# Patient Record
Sex: FEMALE | Race: BLACK OR AFRICAN AMERICAN | NOT HISPANIC OR LATINO | Employment: FULL TIME | URBAN - METROPOLITAN AREA
[De-identification: names, ages, dates, MRNs, and addresses within clinical notes are randomized per-mention and may not be internally consistent; named-entity substitution may affect disease eponyms.]

---

## 2023-03-03 LAB
ALANINE AMINOTRANSFERASE (SGPT) (U/L) IN SER/PLAS: 24 U/L (ref 7–45)
ALBUMIN (G/DL) IN SER/PLAS: 4.3 G/DL (ref 3.4–5)
ALKALINE PHOSPHATASE (U/L) IN SER/PLAS: 106 U/L (ref 33–110)
ANION GAP IN SER/PLAS: 11 MMOL/L (ref 10–20)
ASPARTATE AMINOTRANSFERASE (SGOT) (U/L) IN SER/PLAS: 21 U/L (ref 9–39)
BILIRUBIN TOTAL (MG/DL) IN SER/PLAS: 0.5 MG/DL (ref 0–1.2)
CALCIUM (MG/DL) IN SER/PLAS: 10.5 MG/DL (ref 8.6–10.6)
CARBON DIOXIDE, TOTAL (MMOL/L) IN SER/PLAS: 29 MMOL/L (ref 21–32)
CHLORIDE (MMOL/L) IN SER/PLAS: 106 MMOL/L (ref 98–107)
CREATININE (MG/DL) IN SER/PLAS: 0.85 MG/DL (ref 0.5–1.05)
GFR FEMALE: 80 ML/MIN/1.73M2
GLUCOSE (MG/DL) IN SER/PLAS: 80 MG/DL (ref 74–99)
POTASSIUM (MMOL/L) IN SER/PLAS: 4.5 MMOL/L (ref 3.5–5.3)
PROTEIN TOTAL: 6.3 G/DL (ref 6.4–8.2)
SODIUM (MMOL/L) IN SER/PLAS: 141 MMOL/L (ref 136–145)
THYROTROPIN (MIU/L) IN SER/PLAS BY DETECTION LIMIT <= 0.05 MIU/L: 1.12 MIU/L (ref 0.44–3.98)
THYROXINE (T4) FREE (NG/DL) IN SER/PLAS: 1.43 NG/DL (ref 0.78–1.48)
UREA NITROGEN (MG/DL) IN SER/PLAS: 14 MG/DL (ref 6–23)

## 2023-03-04 LAB — PARATHYRIN INTACT (PG/ML) IN SER/PLAS: 43.9 PG/ML (ref 18.5–88)

## 2023-03-06 LAB
THYROGLOBULIN AB (IU/ML) IN SER/PLAS: <0.9 IU/ML (ref 0–4)
THYROGLOBULIN LC-MS/MS: NORMAL NG/ML (ref 1.3–31.8)
THYROGLOBULIN: 3.1 NG/ML (ref 1.3–31.8)

## 2024-02-15 ENCOUNTER — OFFICE VISIT (OUTPATIENT)
Dept: PAIN MEDICINE | Facility: CLINIC | Age: 59
End: 2024-02-15
Payer: COMMERCIAL

## 2024-02-15 DIAGNOSIS — M47.817 LUMBOSACRAL SPONDYLOSIS WITHOUT MYELOPATHY: Primary | ICD-10-CM

## 2024-02-15 DIAGNOSIS — M67.40 GANGLION CYST: ICD-10-CM

## 2024-02-15 DIAGNOSIS — M16.9 HIP ARTHROSIS: ICD-10-CM

## 2024-02-15 PROCEDURE — 99213 OFFICE O/P EST LOW 20 MIN: CPT | Performed by: ANESTHESIOLOGY

## 2024-02-15 RX ORDER — LIDOCAINE 50 MG/G
1 PATCH TOPICAL DAILY
Qty: 30 PATCH | Refills: 0 | Status: SHIPPED | OUTPATIENT
Start: 2024-02-15

## 2024-02-15 ASSESSMENT — PAIN - FUNCTIONAL ASSESSMENT: PAIN_FUNCTIONAL_ASSESSMENT: 0-10

## 2024-02-15 ASSESSMENT — PAIN SCALES - GENERAL: PAINLEVEL_OUTOF10: 6

## 2024-02-15 NOTE — PROGRESS NOTES
Subjective   Patient ID: Rachel Powell is a 58 y.o. female with a past medical history of Fibromyalgia, left hip pain.  She was last seen in this clinic 7/28/2022 with pain complaints consistent with fibromyalgia, lumbar radiculopathy, hip osteoarthritis.  She was referred to rheumatology for further evaluation of her hip arthritis.  At that time she was seen by rheumatology but did not want to see physical therapy.  Patient reports that that she is continue to have left hip pain and that is her primary complaint today.  States that over the past 1 month her pain has become significantly worse in the left hip which she attributes to a ganglion cyst seen on MRI 8/18/2021.  She is taking ibuprofen 800 mg 3 times daily, Tylenol with minimal relief.  She has not had recent physical therapy.  She states that her pain is excruciating and is mostly in the left hip and groin region.  Notes that her pain worsens with prolonged sitting and going up and down stairs.  She has improvement with icing her hip and with rest.  She occasionally has left anterior thigh pain but states that this originates in the groin and occurs with certain positions such as laying on her hips without a pillow between her legs.  Otherwise denies any numbness or tingling.  She previously had left hip injection 2021 but notes 0% improvement with this.        Physical Therapy: The patient has not done physical therapy within the past six months  Other Conservative Measures she has tried: Ice and Injections  Classes of medications tried in the past: Acetaminophen and NSAIDs    Review of Systems   13-point ROS done and negative except for HPI.     Current Outpatient Medications   Medication Instructions    lidocaine (Lidoderm) 5 % patch 1 patch, transdermal, Daily, Remove & discard patch within 12 hours or as directed by MD.       Past Medical History:   Diagnosis Date    Sickle-cell trait (CMS/[facility]) 09/24/2015    Sickle cell trait        No past surgical  history on file.     No family history on file.     No Known Allergies     Objective     There were no vitals filed for this visit.     Physical Exam  General: NAD, well groomed, well nourished  Eyes: Non-icteric sclera, EOMI  Ears, Nose, Mouth, and Throat: External ears and nose appear to be without deformity or rash. No lesions or masses noted. Hearing is grossly intact.   Neck: Trachea midline  Respiratory: Nonlabored breathing   Cardiovascular: no peripheral edema   Skin: No rashes or open lesions/ulcers identified on skin.    Back:   Palpation: Mild tenderness to palpation over left lumbar paraspinous muscles.   Straight leg raise: does not reproduce their pain, bilaterally   SI Joint: DOROTEO on the left with left groin pain    Left Hip: Pain over left greater trochanter. and Pain reproduced with internal/external rotation.    Neurologic:   Cranial nerves grossly intact.   Strength 5/5 and symmetric plantar/dorsiflexion. Strength 4/5 with left hip flexion   Sensation: Normal to light touch throughout,   DTRs:normal and symmetric throughout    Psychiatric: Alert, orientation to person, place, and time. Cooperative.    Imaging personally reviewed and independently interpreted: Hip x-ray 9/13/2021 shows cam morphology of the right hip, no significant degenerative changes in hip.  Hip MRI 8/18/2021 shows moderate SI joint degenerative changes, left intra acetabular ganglion cyst as well as mild bilateral hip arthrosis.    Assessment/Plan   58 y.o. female with a past medical history of Fibromyalgia, left hip pain.  She was last seen in this clinic 7/28/2022 with pain complaints consistent with fibromyalgia, lumbar radiculopathy, hip osteoarthritis.  She returns to clinic today with severe left hip pain which has worsened over the past 1 month.  She had an MRI 8/18/2021 which showed mild bilateral hip arthrosis as well as left anterior acetabular ganglion cyst.  Was seen by Dr. Licea around that time and given a hip  injection without any improvement.  Exam, history, imaging consistent with left hip pathology.    Plan:  -Patient referred back to orthopedic surgery, Dr. Licea (has seen in the past but not recently) for further evaluation of left hip and ganglion cyst  -Discussed with the patient that should no surgical intervention be warranted, if she continues to have ongoing pain, we could consider doing diagnostic sensory obturator and femoral nerve branch blocks to assist with pain relief.  If she does have relief with the diagnostic block could consider radiofrequency ablation.  However, would like patient to see orthopedic surgery before doing any hip procedures.  Might also consider a fluoroscopically guided intra-articular hip injection.  -Prescription for lidocaine patches per patient request  The patient was invited to contact us back anytime with any questions or concerns and follow-up with us in the office as needed.     Diagnoses and all orders for this visit:  Lumbosacral spondylosis without myelopathy  -     Referral to Orthopaedic Surgery; Future  -     lidocaine (Lidoderm) 5 % patch; Place 1 patch over 12 hours on the skin once daily. Remove & discard patch within 12 hours or as directed by MD.  Hip arthrosis  Ganglion cyst      This note was generated with the aid of dictation software, there may be typos despite my attempts at proofreading.

## 2024-03-27 ENCOUNTER — OFFICE VISIT (OUTPATIENT)
Dept: ORTHOPEDIC SURGERY | Facility: HOSPITAL | Age: 59
End: 2024-03-27
Payer: COMMERCIAL

## 2024-03-27 ENCOUNTER — HOSPITAL ENCOUNTER (OUTPATIENT)
Dept: RADIOLOGY | Facility: HOSPITAL | Age: 59
Discharge: HOME | End: 2024-03-27
Payer: COMMERCIAL

## 2024-03-27 DIAGNOSIS — M25.552 LEFT HIP PAIN: ICD-10-CM

## 2024-03-27 DIAGNOSIS — M47.817 LUMBOSACRAL SPONDYLOSIS WITHOUT MYELOPATHY: ICD-10-CM

## 2024-03-27 PROCEDURE — 73502 X-RAY EXAM HIP UNI 2-3 VIEWS: CPT | Mod: LEFT SIDE | Performed by: RADIOLOGY

## 2024-03-27 PROCEDURE — 73502 X-RAY EXAM HIP UNI 2-3 VIEWS: CPT | Mod: LT

## 2024-03-27 PROCEDURE — 99204 OFFICE O/P NEW MOD 45 MIN: CPT | Performed by: ORTHOPAEDIC SURGERY

## 2024-03-27 PROCEDURE — 99214 OFFICE O/P EST MOD 30 MIN: CPT | Performed by: ORTHOPAEDIC SURGERY

## 2024-03-27 RX ORDER — CEPHRADINE 500 MG
CAPSULE ORAL
COMMUNITY
Start: 2019-01-04

## 2024-03-27 RX ORDER — MULTIVITAMIN
TABLET ORAL
COMMUNITY
Start: 2022-01-28

## 2024-03-27 RX ORDER — IBUPROFEN 800 MG/1
TABLET ORAL EVERY 12 HOURS
COMMUNITY
Start: 2017-10-10

## 2024-03-27 RX ORDER — IBUPROFEN AND DIPHENHYDRAMINE CITRATE 200; 38 MG/1; MG/1
1 TABLET, COATED ORAL NIGHTLY
COMMUNITY
Start: 2022-10-20

## 2024-03-27 RX ORDER — LEVOTHYROXINE SODIUM 125 UG/1
125 TABLET ORAL DAILY
COMMUNITY

## 2024-03-27 RX ORDER — ACETAMINOPHEN 325 MG/1
TABLET ORAL 3 TIMES DAILY PRN
COMMUNITY

## 2024-03-27 ASSESSMENT — PAIN SCALES - GENERAL: PAINLEVEL_OUTOF10: 8

## 2024-03-27 ASSESSMENT — PAIN DESCRIPTION - DESCRIPTORS: DESCRIPTORS: OTHER (COMMENT);PRESSURE

## 2024-03-27 ASSESSMENT — PAIN - FUNCTIONAL ASSESSMENT: PAIN_FUNCTIONAL_ASSESSMENT: 0-10

## 2024-03-27 NOTE — PROGRESS NOTES
HPI  This is a pleasant 58 y.o. female here today for left hip pain. The pt has long history of degenerative lumbar pain for which she sees pain management. She has a history of L hip evaluated in 2021 and treated with a L hip CSI without relief. MRI at that time showed a paralabral cyst. Her pain since improved to some degree and she is now presenting about 3 months of worsening L groin pain. She works as a cleaning person at an apartment complex. She takes Tylenol and Ibuprofen and Lidocaine patches for her hip and back. She states that work related activity and prolonged sitting exacerbate her pain. Denies any injuries or trauma. She states that CSI to her lumbar spine no longer help.         Past Medical History:   Diagnosis Date    Sickle-cell trait (CMS/[facility]) 09/24/2015    Sickle cell trait       Past Surgical History:   Procedure Laterality Date    CARPAL TUNNEL RELEASE Right     HYSTERECTOMY      THYROIDECTOMY         Social History     Tobacco Use    Smoking status: Some Days     Types: Cigarettes    Smokeless tobacco: Never   Vaping Use    Vaping Use: Never used   Substance Use Topics    Alcohol use: Yes     Comment: a couple of drinks per year on special occasions    Drug use: Yes     Types: Marijuana          ROS  Review of systems reviewed and pertinent positives mentioned in HPI.      PHYSICAL EXAM  There is not  pain with a resisted situp.    There is not abdominal distention or tenderness    The patient's range of motion reveals that they have 90° of hip flexion on the affected side.   Hip extension to 10°   Abduction to 45°      The patient is 5 out of 5 strength with resisted hip AB, adduction, hamstring and quadriceps testing.    Pain over the hip flexor, ASIS.  Pain over the proximal hamstring, piriformis      Pain Provacation testing:    Positive impingement sign,with a painful arc from 12 to 3:00.  Negative Psoas impingement/Ehsan test  Negative instability, Log roll.  Positive subspine  impingement test, which is pain with straight hip flexion.    Negative straight leg raise.  Negative circumduction clunk.      Peritrochanteric space examination:  Tenderness over the aki-trochanteric space - Yes  pain over the posterior trochanter - Yes      IMAGING  X-rays reviewed reveal no gross fracture or dislocation. and moderate degenerative changes noted.    MRI reviewed reveals No MRI available for review      ASSESSMENT  Left  osteoarthritis      PLAN  This is a 58 y.o. female patient here today with significant hip pain.    I discussed that the etiology of the patients pain is unclear at this time as she has a long history of degenerative lumbar pain and evidence of moderate to severe OA of her L hip on Xray today as well as symptoms including groin pain. A L hip CSI in 2021 was unsuccessful in providing any relief. However, the presentation today is new and we discussed that there is utility and repeating an injection as this may address potential hip OA pain and provide diagnostic information to help guide treatment. She will be referred to Steffanie ARCOS for a US guided L hip injection and was instructed to keep mental note of what effect the injection provides and at what time and to what degree. All questions were addressed and pt is agreeable with plan.

## 2024-04-17 ENCOUNTER — HOSPITAL ENCOUNTER (OUTPATIENT)
Dept: RADIOLOGY | Facility: EXTERNAL LOCATION | Age: 59
Discharge: HOME | End: 2024-04-17
Payer: COMMERCIAL

## 2024-04-17 ENCOUNTER — OFFICE VISIT (OUTPATIENT)
Dept: ORTHOPEDIC SURGERY | Facility: HOSPITAL | Age: 59
End: 2024-04-17
Payer: COMMERCIAL

## 2024-04-17 DIAGNOSIS — M16.12 PRIMARY OSTEOARTHRITIS OF LEFT HIP: Primary | ICD-10-CM

## 2024-04-17 DIAGNOSIS — M25.552 LEFT HIP PAIN: ICD-10-CM

## 2024-04-17 PROCEDURE — 2500000005 HC RX 250 GENERAL PHARMACY W/O HCPCS: Performed by: PHYSICIAN ASSISTANT

## 2024-04-17 PROCEDURE — 2500000004 HC RX 250 GENERAL PHARMACY W/ HCPCS (ALT 636 FOR OP/ED): Performed by: PHYSICIAN ASSISTANT

## 2024-04-17 PROCEDURE — 20611 DRAIN/INJ JOINT/BURSA W/US: CPT | Performed by: PHYSICIAN ASSISTANT

## 2024-04-17 NOTE — PROGRESS NOTES
Left hip      Patient is here today regarding left hip pain.  She recently saw Dr. Licea.  She has osteoarthritis of the left hip.  She is also had some symptoms consistent with a lumbar radiculopathy.  She has had injections for the back but does no longer help.  Dr. Licea suggested an intra-articular injection for both diagnostic and therapeutic purposes.  She would like to proceed with that option.        Patient ID: Rachel Powell is a 58 y.o. female.    L Inj/Asp: L hip joint on 4/18/2024 11:18 AM  Indications: pain  Details: 20 G needle, ultrasound-guided anterior approach  Medications: 40 mg methylPREDNISolone acetate 40 mg/mL; 4 mL lidocaine 10 mg/mL (1 %)    After consent was obtained, the anterior left hip was prepped in a sterile fashion. Ultrasound guidance was used to help insure proper needle placement into the hip joint, decrease patient discomfort, and decrease collateral damage. The joint was aspirated and Depo 40 mg with lidocaine 4cc were injected without any complications. Ultrasound images were saved on an internal file for later referene. The patient tolerated the procedure well and the area was cleaned and bandaged.    Procedure, treatment alternatives, risks and benefits explained, specific risks discussed. Consent was given by the patient. Immediately prior to procedure a time out was called to verify the correct patient, procedure, equipment, support staff and site/side marked as required. Patient was prepped and draped in the usual sterile fashion.             Steffanie Saucedo PA-C

## 2024-04-18 PROCEDURE — 20611 DRAIN/INJ JOINT/BURSA W/US: CPT | Performed by: PHYSICIAN ASSISTANT

## 2024-04-18 PROCEDURE — 2500000005 HC RX 250 GENERAL PHARMACY W/O HCPCS: Performed by: PHYSICIAN ASSISTANT

## 2024-04-18 PROCEDURE — 2500000004 HC RX 250 GENERAL PHARMACY W/ HCPCS (ALT 636 FOR OP/ED): Performed by: PHYSICIAN ASSISTANT

## 2024-04-18 RX ORDER — METHYLPREDNISOLONE ACETATE 40 MG/ML
40 INJECTION, SUSPENSION INTRA-ARTICULAR; INTRALESIONAL; INTRAMUSCULAR; SOFT TISSUE
Status: COMPLETED | OUTPATIENT
Start: 2024-04-18 | End: 2024-04-18

## 2024-04-18 RX ORDER — LIDOCAINE HYDROCHLORIDE 10 MG/ML
4 INJECTION INFILTRATION; PERINEURAL
Status: COMPLETED | OUTPATIENT
Start: 2024-04-18 | End: 2024-04-18

## 2024-04-18 RX ADMIN — LIDOCAINE HYDROCHLORIDE 4 ML: 10 INJECTION, SOLUTION INFILTRATION; PERINEURAL at 11:18

## 2024-04-18 RX ADMIN — METHYLPREDNISOLONE ACETATE 40 MG: 40 INJECTION, SUSPENSION INTRA-ARTICULAR; INTRALESIONAL; INTRAMUSCULAR; INTRASYNOVIAL; SOFT TISSUE at 11:18

## 2024-05-10 ENCOUNTER — EVALUATION (OUTPATIENT)
Dept: PHYSICAL THERAPY | Facility: CLINIC | Age: 59
End: 2024-05-10
Payer: COMMERCIAL

## 2024-05-10 DIAGNOSIS — M16.12 PRIMARY OSTEOARTHRITIS OF LEFT HIP: ICD-10-CM

## 2024-05-10 PROCEDURE — 97110 THERAPEUTIC EXERCISES: CPT | Mod: GP | Performed by: PHYSICAL THERAPIST

## 2024-05-10 PROCEDURE — 97161 PT EVAL LOW COMPLEX 20 MIN: CPT | Mod: GP | Performed by: PHYSICAL THERAPIST

## 2024-05-10 ASSESSMENT — ENCOUNTER SYMPTOMS
DEPRESSION: 0
OCCASIONAL FEELINGS OF UNSTEADINESS: 0
LOSS OF SENSATION IN FEET: 0
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
## 2024-05-14 ENCOUNTER — TREATMENT (OUTPATIENT)
Dept: PHYSICAL THERAPY | Facility: CLINIC | Age: 59
End: 2024-05-14
Payer: COMMERCIAL

## 2024-05-14 DIAGNOSIS — M16.12 PRIMARY OSTEOARTHRITIS OF LEFT HIP: ICD-10-CM

## 2024-05-14 PROCEDURE — 97113 AQUATIC THERAPY/EXERCISES: CPT | Mod: GP | Performed by: PHYSICAL THERAPIST

## 2024-05-14 NOTE — PROGRESS NOTES
PHYSICAL THERAPY NOTE    Patient Name: Rachel Powell  MRN: [id number]  Today's Date: 5/14/2024    Time Calculation  Start Time: 1220  Stop Time: 1305  Time Calculation (min): 45 min     PT Therapeutic Procedures Time Entry  Aquatic Therapy Time Entry: 45                 Diagnosis:  OA L Hip  Referred by:  Steffanie Saucedo PA-C    INSURANCE 80% w/ deductible   Visit 1   Visit Limits: 60   Cert Dates: No V used   Rechecks:    Eval PT: LK     Precautions and PMHx Fall Risk Supervision   Sickle cell trait; 2002 total hysterectomy NONE NONE   2018 thyroidectomy, 2019 CTS     Rtc tendonitis 2016;  fibromyalgia       SUBJECTIVE  HPI/DOI/MOUNIKA:    Being followed by Dr. Licea for left hip OA and may have to have B hip replacements in the future.   HX chronic back/hip B pain.  PAIN / LOCATION / DESCRIPTION:     3/10 right hip and back B sides  PAIN AGGRAVATING FACTORS:  sitting and rising from sitting is when her hip hurts (after 30 min of sitting)  FUNCTIONAL LIMITATIONS:  prolonged sitting  Xrays:   OA severe; cam morphology of femur associated with femoroacetabular impingement; acetabular ganglion cyst, subchondral marrow edema left > right  MRI/CT scan:  glute medius tendinosis w/ bursal fluid.  Osteophytes in pubic symphysis, pelvis, facet joints  MEDICATIONS:  has had hip and back injections in PMHX.    PLOF:   long hx of B hip and back/pelvis pain  PATIENT STATED GOALS:   reduce pain  HEP:  DID NOT PERFORM AT ALL - reports she will try after today's visit.    OBJ Hip  Left leg slightly longer than Right  Balance:    10 sec B    STRENGTH:   ?/5  Hip Flex R/L:    5/4    Hip Abd R/L:  4/4-  Hip Ext R/L:  4/4  Hip ER R/L:   4/4  Hip IR R/L:    4/4  Knee Ext R/L:  4/4  Knee Flex R/L:   4/4  Ankle DF R/L:    5/4    ROM: (supine) DEG.  Hip  ER R/L:   30/30  IR R/L:   15/15  KNEE R:  0-118  KNEE L:  0-115    Lumbar ROM:  Flexion 55 deg  Extension 20 deg    Special Testing for Intra-articular HIP pathology:  Ehsan Test (assess  hip/SI and labral pathology: negative R;   groin L pain    Lumbar Repeated Testing Screen:  low back 4/10, no pain in B hips/groin  RFIS  produced left hip pain 4/10; NW,   low back abolished pain  GENO  produced right leg electrical feeling from foot to lateral hip region; NW,   ne back pain, ne left hip  Prone lying: ne  Caden:  electricity down both legs; NW  PPU: n/t due to rad down both legs to feet (plantar surfaces):  L5-S1    Therapeutic Interventions:  Aquatics  Walking forwards x 4 laps  Mini squats x 30  Heel raises x 30  Hip abduction x 30  Hip extension x 30  Hip flexion x 30  Ham curls x 30  Hip circumduction x 30  Btw:  dls B UE horizontal abd/add  Btw:  dls B UE flex/ext  Side stepping w/ B UE abd/add  Steps:  hamstring stretch:  30 sec x 2  Steps:  knee flexion stretch and bend over:  stretch calf on other side as well 30 sec x 2              ASSESSMENT  Response to interventions:   Patient tolerated treatment well without increased pain or complaints following treatment.   Initiated aquatic therapy, per patient's agreement.  She was able to perform motions requested but modified as needed due to B hip rom loss w/ er/ir.  She tolerated flexion bias for her back well.  Prolonged standing in chest high water was tolerated well.   Patient did demonstrate some minor fear so will not attempt deep water activities at this time.  Continue water for  B hip/back rom, flex, strengthening as mary..     Progress toward functional goals:   Initiated goals and poc this date.    GOALS: includes patient and therapist collaboration and stated goals:  Patient to be seen 2 x a week for 6 weeks:  ST weeks:   Patient independent with home exercise program.  LT-8 weeks (unless otherwise noted below):  1.  Patient to report reduction of pain by 50-60% at minimum in order to improve ckc tolerances.  2.  Patient to demonstrate an improvement in arom to reach gait and step goals.  3.  Patient to demonstrate increase  in strength by 1 mmt grade in order to reach gait and step goals.  4.  Patient able to walk on level surfaces w/ LRD or no AD (if appropriate) x 1000 feet, I or MI, with appropriate gait pattern.  5.  Patient able to ascend/descend 12 steps x 1 rep with one HR, I or MI (LRD), reciprocal pattern with appropriate technique for safety.  6.  Patient to demonstrate improvement in balance to good on level surfaces during gait activities performed in clinic with I or MI, no AD or LRD.  7.  Patient to demonstrate improvement in functional outcome form to reach gait and step goals.     Justification for skilled care:   Patient will require skilled PT care 1-2 x a week for 6 weeks in order to assess and modify hep / clinical program in order to reach goals and achieve functional mobility / PLOF .       Education and Treatment Interventions performed this date:   TX rationale, HEP, safety education, fall prevention education as appropriate.  Pt. continues to be educated on HEP, Anatomy and function, Dx, TX findings, POC, goals of therapy, activity modification, proper posture and body mechanics. HEP continues to be reviewed with pt. for any questions, concerns, modifications needed and progressions.  All questions and concerns were addressed during tx.  Patient demonstrated verbal confirmation of understanding of education provided.       Flexibility (Stretches)   [id number]  SINGLE KNEE TO CHEST STRETCH - SKTC -  Repeat 4 Repetitions, Hold 30 Seconds, Complete 1 Set, Perform 2 Times a Day  PIRIFORMIS STRETCH MODIFIED 3 -  Repeat 4 Repetitions, Hold 30 Seconds, Complete 1 Set, Perform 2 Times a Day  SUPINE HIP EXTERNAL ROTATION STRETCH - MODIFIED DOROTEO OR FIGURE 4 -  Repeat 4 Repetitions, Hold 30 Seconds, Complete 1 Set, Perform 2 Times a Day  ROTATIONAL QUADRATUS STRETCH -  Repeat 4 Repetitions, Hold 30 Seconds, Complete 1 Set, Perform 2 Times a Day  FROG Hip adductor and groin stretch supine -  Repeat 2 Repetitions, Hold 30  Seconds, Complete 1 Set, Perform 2 Times a Day  SEATED HAMSTRING STRETCH -  Repeat 4 Repetitions, Hold 30 Seconds, Complete 1 Set, Perform 2 Times a Day  STANDING CALF STRETCH  - GASTROCNEMIUS -  Repeat 4 Repetitions, Hold 30 Seconds, Complete 1 Set, Perform 2 Times a Day       PLAN OF CARE TO INCLUDE the following possible treatment interventions:  Cryotherapy, Edema Control, Education/Instruction, Gait Training, Home Program Development, Manual therapy, Neuromuscular Re-education, Therapeutic Activity, Therapeutic Exercise,  Balance Training; manual; IDN, [place] and/or Electrical Stimulation(PRN if not contraindicated).  Frequency & Duration:  Patient is appropriate for skilled care PT 1-2 x per week for 4-6 weeks in order to reduce impairments identified in objective and assessment section and improve functional mobility tolerances to return patient to highest level of PLOF.  Plan of Care Agreement:   Patient participated in and is agreeable to the POC.    PLAN:  Patient is a 58 year old with a diagnosis of OA left hip/pain.    Patient presents with B lumbar stenosis and B hip OA..   She also demonstrates weakness of core and B hips/knees that would impact her functional mobilities.   Patient does appear to have some B multi level lumbar derangement involvements as well during repeated motion testing, however, electricity sensation down both legs to feet (L5-S1) was caused by extension and could not continue any extension bias activities.   Will focus on lumbar rad caused down B LEs w/ flexion bias work initially and progress as tolerated.   Will also focus on B hip rom, flexibility, core/hip strength and functional tolerances.  Patient would benefit from skilled care PT 1-2 x a week for 4-6 weeks to focus on pain reduction, rom/strength improvements in order to improve her functional mobilities.    Perform aquatic therapy at this time until she tolerates land due to her pain levels.

## 2024-05-28 ENCOUNTER — APPOINTMENT (OUTPATIENT)
Dept: PHYSICAL THERAPY | Facility: CLINIC | Age: 59
End: 2024-05-28
Payer: COMMERCIAL

## 2024-05-30 ENCOUNTER — APPOINTMENT (OUTPATIENT)
Dept: PHYSICAL THERAPY | Facility: CLINIC | Age: 59
End: 2024-05-30
Payer: COMMERCIAL

## 2024-06-03 ENCOUNTER — APPOINTMENT (OUTPATIENT)
Dept: PHYSICAL THERAPY | Facility: CLINIC | Age: 59
End: 2024-06-03
Payer: COMMERCIAL

## 2024-06-05 ENCOUNTER — APPOINTMENT (OUTPATIENT)
Dept: PHYSICAL THERAPY | Facility: CLINIC | Age: 59
End: 2024-06-05
Payer: COMMERCIAL

## 2024-06-10 ENCOUNTER — APPOINTMENT (OUTPATIENT)
Dept: PHYSICAL THERAPY | Facility: CLINIC | Age: 59
End: 2024-06-10
Payer: COMMERCIAL

## 2024-06-12 ENCOUNTER — APPOINTMENT (OUTPATIENT)
Dept: PHYSICAL THERAPY | Facility: CLINIC | Age: 59
End: 2024-06-12
Payer: COMMERCIAL

## 2024-06-17 ENCOUNTER — APPOINTMENT (OUTPATIENT)
Dept: PHYSICAL THERAPY | Facility: CLINIC | Age: 59
End: 2024-06-17
Payer: COMMERCIAL

## 2024-06-19 ENCOUNTER — APPOINTMENT (OUTPATIENT)
Dept: PHYSICAL THERAPY | Facility: CLINIC | Age: 59
End: 2024-06-19
Payer: COMMERCIAL

## 2024-06-24 ENCOUNTER — APPOINTMENT (OUTPATIENT)
Dept: PHYSICAL THERAPY | Facility: CLINIC | Age: 59
End: 2024-06-24
Payer: COMMERCIAL

## 2024-06-26 ENCOUNTER — APPOINTMENT (OUTPATIENT)
Dept: PHYSICAL THERAPY | Facility: CLINIC | Age: 59
End: 2024-06-26
Payer: COMMERCIAL

## 2024-06-27 DIAGNOSIS — E89.0 POSTOPERATIVE HYPOTHYROIDISM: ICD-10-CM

## 2024-06-28 RX ORDER — LEVOTHYROXINE SODIUM 125 UG/1
TABLET ORAL
Qty: 30 TABLET | Refills: 5 | Status: SHIPPED | OUTPATIENT
Start: 2024-06-28

## 2024-07-01 ENCOUNTER — APPOINTMENT (OUTPATIENT)
Dept: PHYSICAL THERAPY | Facility: CLINIC | Age: 59
End: 2024-07-01
Payer: COMMERCIAL

## 2024-10-11 ENCOUNTER — APPOINTMENT (OUTPATIENT)
Dept: ENDOCRINOLOGY | Facility: CLINIC | Age: 59
End: 2024-10-11
Payer: COMMERCIAL

## 2024-10-11 VITALS
DIASTOLIC BLOOD PRESSURE: 82 MMHG | SYSTOLIC BLOOD PRESSURE: 125 MMHG | WEIGHT: 194 LBS | HEIGHT: 64 IN | HEART RATE: 64 BPM | BODY MASS INDEX: 33.12 KG/M2

## 2024-10-11 DIAGNOSIS — E89.0 POSTOPERATIVE HYPOTHYROIDISM: Primary | ICD-10-CM

## 2024-10-11 PROCEDURE — 99214 OFFICE O/P EST MOD 30 MIN: CPT | Performed by: INTERNAL MEDICINE

## 2024-10-11 PROCEDURE — 3008F BODY MASS INDEX DOCD: CPT | Performed by: INTERNAL MEDICINE

## 2024-10-11 RX ORDER — LEVOTHYROXINE SODIUM 125 UG/1
125 TABLET ORAL DAILY
Qty: 90 TABLET | Refills: 3 | Status: SHIPPED | OUTPATIENT
Start: 2024-10-11 | End: 2025-10-11

## 2024-10-11 ASSESSMENT — PAIN SCALES - GENERAL: PAINLEVEL: 3

## 2024-10-11 NOTE — PROGRESS NOTES
Follow up visit for hypothyroidism. Last visit in 3/2023     History of Present IllnessMsStephenie Powell is a 56 yo F with known prior hx of chronic lumbar pain, OA, fibromyalgia, carpal tunnel, who was initially seen on our clinic on 7/2018 for evaluation of non-functioning MNG. On 9/2018, she underwent for total thyroidectomy. Incidental microscopic thyroid cancer was found on path (0.2 cm on L inferior thyroid lobe).She was placed on levothyroxine since surgery.   She was last seen in Feb 2022    INTERVAL HX:  -Overall doing well, however she remains having generalized pain dur to degenerated L2, arthritis, capal tunnel syndrome, knee osteoarthritis. Takes ibuprofen and aspirin, tylenol all the time, but has stopped taking due to diarrhea induced by aspirin. Reports to good energy, stable weight; no concerns. changed jobs ; happier with the new one      Current regimen:  -Levothyroxine 125 mcg once daily. Taking as indicated   -Vitamin D 10,000 UI once daily. Takes on and off, misses few days in between     Sx of hyper- or hypothyroidism:   Energy: good, has to joint pain   Sleep:  has improved over 1 week but has problems sleeping due to joint pains  Moods : stable  Eye symptoms: dry eyes  Dysphagia: none   New neck lump: none  Dyspnea: none  Cough: none  Palpitations: none  Tremor: none  Temperature: no cold intolerance, has sweating with no hot intolerance  No muscle cramps, has nerve pain  Weight change: fluctuating. Lost 8 lbs over 1 week after having diarrhea with aspirin  Periods: hystrectomy in 2002, has 1 ovary  Patient is taking levothyroxine? Yes, at 5;30 am with water, no other meds, eats after 1 hour of med intake        ROS: 10 point review of systems was otherwise negative except per the HPI.        Social Hx:  Working as  in SNF.   Former smoker         Active Problems  Problems    · Biceps tendonitis (726.12) (M75.20)   · Carpal tunnel syndrome, right (354.0) (G56.01)   · Colon cancer  screening (V76.51) (Z12.11)   · Colonoscopy planned   · De Quervain's tenosynovitis (727.04) (M65.4)   · Facial swelling (784.2) (R22.0)   · Family history of glaucoma in mother (V19.11) (Z83.511)   · Fibromyalgia (729.1) (M79.7)   · Foreign body of left ear, initial encounter (931,E915) (T16.2XXA)   · Ganglion cyst (727.43) (M67.40)   · Goiter (240.9) (E04.9)   · Hand pain (729.5) (M79.643)   · Hip pain, bilateral (719.45) (M25.551,M25.552)   · Hypothyroidism, postsurgical (244.0) (E89.0)   · Left hip pain (719.45) (M25.552)   · Lumbar radiculopathy (724.4) (M54.16)   · Lumbar spinal stenosis (724.02) (M48.061)   · Lumbosacral neuritis (724.4) (M54.17)   · Mass of lateral neck (784.2) (R22.1)   · MGD (meibomian gland disease) (373.00) (H02.889)   · Multinodular goiter (241.1) (E04.2)   · Myopia with presbyopia (367.1,367.4) (H52.10,H52.4)   · Osteoarthritis of wrist (715.93) (M19.039)   · Osteoarthrosis, hip (715.35) (M16.9)   · Pain, joint, shoulder (719.41) (M25.519)   · Primary osteoarthritis of right knee (715.16) (M17.11)   · Right shoulder pain (719.41) (M25.511)   · Sickle cell trait (282.5) (D57.3)   · Spondylolisthesis, acquired (738.4) (M43.10)   · Thyroid cancer (193) (C73)   · Thyroid nodule (241.0) (E04.1)   · Tubular adenoma of colon (211.3) (D12.6)   · repeat surveillance colonoscopy due April 2023 (Dr. Weller)   · Urge and stress incontinence (788.33) (N39.46)   · Vitamin D deficiency (268.9) (E55.9)   · Vitamin D deficiency (268.9) (E55.9)   · Wrist pain (719.43) (M25.539)     Past Medical History  Problems    · Sickle cell trait (282.5) (D57.3)     Family History  Mother    · Family history of diabetes mellitus (V18.0) (Z83.3)  Father    · Family history of diabetes mellitus (V18.0) (Z83.3)   · Family history of hypertension (V17.49) (Z82.49)  Grandmother    · Family history of diabetes mellitus (V18.0) (Z83.3)  Maternal Grandmother    · Family history of glaucoma (V19.11) (Z83.511)     Social  "History  Problems    · Alcohol use (V49.89) (Z78.9)   · Current every day smoker (305.1) (F17.200)   · Marijuana     Allergies  Medication    · Aleve   Recorded By: Alexandra Martinez; 9/24/2015 1:59:28 PM   · Aspirin TABS   Recorded By: Alexandra Martinez; 9/24/2015 1:59:28 PM   · Naproxen TABS   Recorded By: Alexandra Martinez; 9/24/2015 1:59:28 PM     Current Meds     Current Outpatient Medications   Medication Instructions    acetaminophen (TylenoL) 325 mg tablet oral, 3 times daily PRN    acetaminophen/diphenhydramine (DIPHENHYDRAMINE-ACETAMINOPHEN ORAL) oral    cholecalciferol, vitamin D3, 250 mcg (10,000 unit) capsule oral    ibuprofen 800 mg tablet oral, Every 12 hours    ibuprofen-diphenhydramine cit (Motrin PM) 200-38 mg tablet per tablet 1 tablet, oral, Nightly    levothyroxine (Synthroid, Levoxyl) 125 mcg tablet Take one tablet in the am on an empty stomach as directed    lidocaine (Lidoderm) 5 % patch 1 patch, transdermal, Daily, Remove & discard patch within 12 hours or as directed by MD.    multivitamin (Daily Multi-Vitamin) tablet oral      Latest Reference Range & Units 02/01/22 16:06 10/20/22 15:36 03/03/23 08:48   Parathyroid Hormone, Intact 18.5 - 88.0 pg/mL 101.8 (H)  43.9   Thyroxine, Free 0.78 - 1.48 ng/dL 1.35  1.43   Thyroid Stimulating Hormone 0.44 - 3.98 mIU/L 0.44  1.12   GLUCOSE 74 - 99 mg/dL 101 (H) 84 80   Thyroglobulin 1.3 - 31.8 ng/mL 3.2  3.1      Latest Reference Range & Units 03/03/23 08:48   Creatinine 0.50 - 1.05 mg/dL 0.85   Calcium 8.6 - 10.6 mg/dL 10.5   Albumin 3.4 - 5.0 g/dL 4.3       Visit Vitals  /82 (BP Location: Right arm, Patient Position: Sitting, BP Cuff Size: Large adult)   Pulse 64   Ht 1.613 m (5' 3.5\")   Wt 88 kg (194 lb)   BMI 33.83 kg/m²   Smoking Status Some Days   BSA 1.99 m²         Physical Exam  GENERAL: Well nourished, well hydrated, in no distress, oriented x 3  EYES: no exophthalmos, no retraction of eyelid, no lid lag, no conjunctiva erythema, CONNEI+.   NECK: no " tenderness, no adenopathies  THYROID: ,Not palpable  Heart :RRR with normal S1 and S2, no murmurs, no gallops, no JVD appreciated  EXTREMITIES: No clubbing, no edema, no cyanosis  NEURO: normal strength, no tremor, normal reflexes, no delay relaxation. Negative [place]   SKIN: acanthosis nigricans in dorsal aspect on neck      Assessment and Plan:  Ms. Powell is a 58 yo F with known prior hx of chronic lumbar pain, OA, fibromyalgia, carpal tunnel, who was initially seen on our clinic on 7/2018 for evaluation of non-functioning MNG. On 9/2018, she underwent for total thyroidectomy. Incidental microscopic thyroid cancer was found on path (0.2 cm on L inferior thyroid lobe).She was placed on levothyroxine since surgery.   She was last seen in Feb 2022      -Overall doing well, however she remains having generalized pain dur to degenerated L2, arthritis, capal tunnel syndrome, knee osteoarthritis. Takes ibuprofen and aspirin, tylenol all the time, but has stopped taking due to diarrhea induced by aspirin. Reports to good energy, stable weight; no concerns. changed jobs ; happier with the new one      Current regimen:  -Levothyroxine 125 mcg once daily. Taking as indicated   -Vitamin D 10,000 UI once daily. Takes on and off, misses few days in between    Plan:  Patient is clinically euthyroid  Continue with tablet levothyroxine 125 mcg daily, empty stomach, no food for 1 hour after medication intake  Obtained thyroid panel, PTH and vitamin D levels  Will review results and call patient with recommendations    Patient to follow-up with PCP for thyroid related issues in future.  Please do not hesitate to contact our clinic in case of any questions or concerns.    The patient was seen and discussed with attending Dr. Yong Benitez.    Genoveva Person MD  Endocrinology fellow

## 2024-10-21 ENCOUNTER — LAB (OUTPATIENT)
Dept: LAB | Facility: LAB | Age: 59
End: 2024-10-21
Payer: COMMERCIAL

## 2024-10-21 DIAGNOSIS — E89.0 POSTOPERATIVE HYPOTHYROIDISM: ICD-10-CM

## 2024-10-21 LAB
ALBUMIN SERPL BCP-MCNC: 4.8 G/DL (ref 3.4–5)
ANION GAP SERPL CALC-SCNC: 14 MMOL/L (ref 10–20)
BUN SERPL-MCNC: 11 MG/DL (ref 6–23)
CALCIUM SERPL-MCNC: 10.6 MG/DL (ref 8.6–10.6)
CHLORIDE SERPL-SCNC: 105 MMOL/L (ref 98–107)
CO2 SERPL-SCNC: 26 MMOL/L (ref 21–32)
CREAT SERPL-MCNC: 0.9 MG/DL (ref 0.5–1.05)
EGFRCR SERPLBLD CKD-EPI 2021: 74 ML/MIN/1.73M*2
GLUCOSE SERPL-MCNC: 92 MG/DL (ref 74–99)
PHOSPHATE SERPL-MCNC: 3.4 MG/DL (ref 2.5–4.9)
POTASSIUM SERPL-SCNC: 4.4 MMOL/L (ref 3.5–5.3)
PTH-INTACT SERPL-MCNC: 67.4 PG/ML (ref 18.5–88)
SODIUM SERPL-SCNC: 141 MMOL/L (ref 136–145)
T4 FREE SERPL-MCNC: 1.31 NG/DL (ref 0.78–1.48)
TSH SERPL-ACNC: 0.39 MIU/L (ref 0.44–3.98)

## 2024-10-21 PROCEDURE — 36415 COLL VENOUS BLD VENIPUNCTURE: CPT

## 2024-10-21 PROCEDURE — 84439 ASSAY OF FREE THYROXINE: CPT

## 2024-10-21 PROCEDURE — 80069 RENAL FUNCTION PANEL: CPT

## 2024-10-21 PROCEDURE — 84443 ASSAY THYROID STIM HORMONE: CPT

## 2024-10-21 PROCEDURE — 83970 ASSAY OF PARATHORMONE: CPT

## 2024-10-21 RX ORDER — LEVOTHYROXINE SODIUM 125 UG/1
125 TABLET ORAL DAILY
Qty: 90 TABLET | Refills: 3 | Status: SHIPPED | OUTPATIENT
Start: 2024-10-21 | End: 2025-10-21

## 2025-02-03 ENCOUNTER — APPOINTMENT (OUTPATIENT)
Dept: PRIMARY CARE | Facility: CLINIC | Age: 60
End: 2025-02-03
Payer: COMMERCIAL

## 2025-02-12 PROBLEM — M17.11 PRIMARY OSTEOARTHRITIS OF RIGHT KNEE: Status: ACTIVE | Noted: 2025-02-12

## 2025-02-12 PROBLEM — E89.0 HYPOTHYROIDISM, POSTSURGICAL: Status: ACTIVE | Noted: 2025-02-12

## 2025-02-14 ENCOUNTER — APPOINTMENT (OUTPATIENT)
Dept: PRIMARY CARE | Facility: CLINIC | Age: 60
End: 2025-02-14
Payer: COMMERCIAL

## 2025-02-14 VITALS
HEART RATE: 57 BPM | BODY MASS INDEX: 35.7 KG/M2 | HEIGHT: 62 IN | DIASTOLIC BLOOD PRESSURE: 87 MMHG | OXYGEN SATURATION: 96 % | SYSTOLIC BLOOD PRESSURE: 128 MMHG | TEMPERATURE: 96.8 F | WEIGHT: 194 LBS

## 2025-02-14 DIAGNOSIS — E89.0 HYPOTHYROIDISM, POSTSURGICAL: Primary | ICD-10-CM

## 2025-02-14 DIAGNOSIS — F12.90 MARIJUANA USER: ICD-10-CM

## 2025-02-14 DIAGNOSIS — E66.812 CLASS 2 OBESITY DUE TO EXCESS CALORIES WITHOUT SERIOUS COMORBIDITY WITH BODY MASS INDEX (BMI) OF 35.0 TO 35.9 IN ADULT: ICD-10-CM

## 2025-02-14 DIAGNOSIS — Z12.31 SCREENING MAMMOGRAM FOR BREAST CANCER: ICD-10-CM

## 2025-02-14 DIAGNOSIS — E78.5 HYPERLIPIDEMIA, UNSPECIFIED HYPERLIPIDEMIA TYPE: ICD-10-CM

## 2025-02-14 DIAGNOSIS — E55.9 VITAMIN D DEFICIENCY: ICD-10-CM

## 2025-02-14 DIAGNOSIS — M15.9 OSTEOARTHRITIS OF MULTIPLE JOINTS, UNSPECIFIED OSTEOARTHRITIS TYPE: ICD-10-CM

## 2025-02-14 DIAGNOSIS — Z13.0 SCREENING FOR ENDOCRINE, METABOLIC AND IMMUNITY DISORDER: ICD-10-CM

## 2025-02-14 DIAGNOSIS — E66.09 CLASS 2 OBESITY DUE TO EXCESS CALORIES WITHOUT SERIOUS COMORBIDITY WITH BODY MASS INDEX (BMI) OF 35.0 TO 35.9 IN ADULT: ICD-10-CM

## 2025-02-14 DIAGNOSIS — Z13.29 SCREENING FOR ENDOCRINE, METABOLIC AND IMMUNITY DISORDER: ICD-10-CM

## 2025-02-14 DIAGNOSIS — Z13.228 SCREENING FOR ENDOCRINE, METABOLIC AND IMMUNITY DISORDER: ICD-10-CM

## 2025-02-14 PROCEDURE — 3008F BODY MASS INDEX DOCD: CPT | Performed by: INTERNAL MEDICINE

## 2025-02-14 PROCEDURE — 99203 OFFICE O/P NEW LOW 30 MIN: CPT | Performed by: INTERNAL MEDICINE

## 2025-02-14 ASSESSMENT — ENCOUNTER SYMPTOMS
CONFUSION: 0
DIZZINESS: 0
APPETITE CHANGE: 0
VOMITING: 0
WOUND: 0
ARTHRALGIAS: 1
SORE THROAT: 0
NUMBNESS: 0
HEADACHES: 0
CONSTIPATION: 0
COUGH: 0
FEVER: 0
SLEEP DISTURBANCE: 0
SHORTNESS OF BREATH: 0
PALPITATIONS: 0
UNEXPECTED WEIGHT CHANGE: 0
EYE DISCHARGE: 0
TROUBLE SWALLOWING: 0
SEIZURES: 0
DIARRHEA: 0
HEMATURIA: 0
JOINT SWELLING: 0
NAUSEA: 0
WHEEZING: 0
WEAKNESS: 0
ABDOMINAL PAIN: 0
FLANK PAIN: 0
EYE PAIN: 0
BLOOD IN STOOL: 0
BACK PAIN: 0
TREMORS: 0
CHILLS: 0
DYSURIA: 0
FREQUENCY: 0
NERVOUS/ANXIOUS: 0

## 2025-02-14 NOTE — ASSESSMENT & PLAN NOTE
- 9/2018, she underwent total thyroidectomy. Incidental microscopic thyroid cancer was found on path ( had multi nodules )  - f/u endocrine who refills med- no recent adjustments , low TSH with NL FT4  Orders:    TSH with reflex to Free T4 if abnormal; Future

## 2025-02-14 NOTE — ASSESSMENT & PLAN NOTE
- Chronic  - established with ortho  aspercream with lidocaine  - IBU, Tylenol PRN   -RT knee >LT  , LEFT hip - steroid shots   - home stretching exercises   - XRAY 2024:   IMPRESSION:  Mild-to-moderate osteoarthritis bilateral hips.

## 2025-02-14 NOTE — PROGRESS NOTES
"Subjective   Patient ID: Rachel Powell is a 59 y.o. female who presents for New Patient Visit (New pt is here to establish care. ).    HPI   NEW PT ( no PCP in years)  Here to establish .  Chart reviewed, PMHX, meds, Social HX- updated at visit   Labs( 2024- low TSH , nl renal) and imaging reviewed.    Recent recent ER visits or hospitalizations:  Jan - Urgent care- RX steroid and ATX , cough med ( not in EPIC)    Specialists:  Ob-gyn - NO  Endocrine-[facility]-post operative hypothyroidism  Ortho-[facility]- hip OA- steroid shots  PT- finished     H/o shingles- stated flared up jan after steroid taken    Review of Systems   Constitutional:  Negative for appetite change, chills, fever and unexpected weight change.   HENT:  Negative for congestion, ear pain, sneezing, sore throat and trouble swallowing.    Eyes:  Negative for pain, discharge and visual disturbance.   Respiratory:  Negative for cough, shortness of breath and wheezing.    Cardiovascular:  Negative for chest pain, palpitations and leg swelling.   Gastrointestinal:  Negative for abdominal pain, blood in stool, constipation, diarrhea, nausea and vomiting.   Genitourinary:  Negative for dysuria, flank pain, frequency, hematuria and urgency.   Musculoskeletal:  Positive for arthralgias. Negative for back pain, gait problem and joint swelling.   Skin:  Negative for rash and wound.   Neurological:  Negative for dizziness, tremors, seizures, syncope, weakness, numbness and headaches.   Psychiatric/Behavioral:  Negative for confusion, sleep disturbance and suicidal ideas. The patient is not nervous/anxious.        Objective   /87   Pulse 57   Temp 36 °C (96.8 °F)   Ht 1.575 m (5' 2\")   Wt 88 kg (194 lb)   SpO2 96%   BMI 35.48 kg/m²          Physical Exam  Vitals and nursing note reviewed.   Constitutional:       General: She is not in acute distress.     Appearance: Normal appearance. She is obese.   HENT:      Head: Normocephalic and atraumatic.      Nose: " Nose normal.      Mouth/Throat:      Mouth: Mucous membranes are moist.      Pharynx: Oropharynx is clear.   Eyes:      Extraocular Movements: Extraocular movements intact.      Conjunctiva/sclera: Conjunctivae normal.      Pupils: Pupils are equal, round, and reactive to light.   Cardiovascular:      Rate and Rhythm: Normal rate and regular rhythm.      Heart sounds: No murmur heard.  Pulmonary:      Effort: Pulmonary effort is normal. No respiratory distress.      Breath sounds: Normal breath sounds. No wheezing or rhonchi.   Abdominal:      General: Abdomen is protuberant. Bowel sounds are normal.      Palpations: Abdomen is soft.      Tenderness: There is no abdominal tenderness.   Musculoskeletal:         General: Normal range of motion.      Cervical back: Neck supple.      Right lower leg: No edema.      Left lower leg: No edema.   Skin:     General: Skin is warm and dry.      Findings: No bruising or rash.   Neurological:      General: No focal deficit present.      Mental Status: She is alert and oriented to person, place, and time.      Motor: No weakness.      Gait: Gait normal.   Psychiatric:         Mood and Affect: Mood normal.         Behavior: Behavior normal.       Assessment/Plan   Assessment & Plan  Hypothyroidism, postsurgical  - 9/2018, she underwent total thyroidectomy. Incidental microscopic thyroid cancer was found on path ( had multi nodules )  - f/u endocrine who refills med- no recent adjustments , low TSH with NL FT4  Orders:    TSH with reflex to Free T4 if abnormal; Future    Hyperlipidemia, unspecified hyperlipidemia type  - due for labs, not on statin  Orders:    Lipid Panel; Future    Osteoarthritis of multiple joints, unspecified osteoarthritis type  - Chronic  - established with ortho  aspercream with lidocaine  - IBU, Tylenol PRN   -RT knee >LT  , LEFT hip - steroid shots   - home stretching exercises   - XRAY 2024:   IMPRESSION:  Mild-to-moderate osteoarthritis bilateral hips.        Screening for endocrine, metabolic and immunity disorder    Orders:    CBC and Auto Differential; Future    Comprehensive Metabolic Panel; Future    Hemoglobin A1C; Future    Marijuana user  - every other day- for recreational use and helps with pain       Vitamin D deficiency  - chronic, taking megadose vit D 10k every other day  Orders:    Vitamin D 25-Hydroxy,Total (for eval of Vitamin D levels); Future    Screening mammogram for breast cancer    Orders:    BI mammo bilateral screening tomosynthesis; Future    Class 2 obesity due to excess calories without serious comorbidity with body mass index (BMI) of 35.0 to 35.9 in adult  - lost over 10 lb , walking , exercises at home          Would like paratransit RTA papers filled ( did not bring any)  DECLINED FLU VAX    RTC PRN

## 2025-03-07 ENCOUNTER — HOSPITAL ENCOUNTER (OUTPATIENT)
Dept: RADIOLOGY | Facility: HOSPITAL | Age: 60
Discharge: HOME | End: 2025-03-07
Payer: COMMERCIAL

## 2025-03-07 DIAGNOSIS — Z12.31 SCREENING MAMMOGRAM FOR BREAST CANCER: ICD-10-CM

## 2025-03-07 PROCEDURE — 77067 SCR MAMMO BI INCL CAD: CPT

## 2025-03-08 LAB
25(OH)D3+25(OH)D2 SERPL-MCNC: 44 NG/ML (ref 30–100)
ALBUMIN SERPL-MCNC: 4.6 G/DL (ref 3.6–5.1)
ALP SERPL-CCNC: 93 U/L (ref 37–153)
ALT SERPL-CCNC: 17 U/L (ref 6–29)
ANION GAP SERPL CALCULATED.4IONS-SCNC: 8 MMOL/L (CALC) (ref 7–17)
AST SERPL-CCNC: 18 U/L (ref 10–35)
BASOPHILS # BLD AUTO: 79 CELLS/UL (ref 0–200)
BASOPHILS NFR BLD AUTO: 1 %
BILIRUB SERPL-MCNC: 0.5 MG/DL (ref 0.2–1.2)
BUN SERPL-MCNC: 11 MG/DL (ref 7–25)
CALCIUM SERPL-MCNC: 10.4 MG/DL (ref 8.6–10.4)
CHLORIDE SERPL-SCNC: 104 MMOL/L (ref 98–110)
CHOLEST SERPL-MCNC: 256 MG/DL
CHOLEST/HDLC SERPL: 5.2 (CALC)
CO2 SERPL-SCNC: 29 MMOL/L (ref 20–32)
CREAT SERPL-MCNC: 0.72 MG/DL (ref 0.5–1.03)
EGFRCR SERPLBLD CKD-EPI 2021: 97 ML/MIN/1.73M2
EOSINOPHIL # BLD AUTO: 213 CELLS/UL (ref 15–500)
EOSINOPHIL NFR BLD AUTO: 2.7 %
ERYTHROCYTE [DISTWIDTH] IN BLOOD BY AUTOMATED COUNT: 13 % (ref 11–15)
EST. AVERAGE GLUCOSE BLD GHB EST-MCNC: 143 MG/DL
EST. AVERAGE GLUCOSE BLD GHB EST-SCNC: 7.9 MMOL/L
GLUCOSE SERPL-MCNC: 104 MG/DL (ref 65–99)
HBA1C MFR BLD: 6.6 % OF TOTAL HGB
HCT VFR BLD AUTO: 45.6 % (ref 35–45)
HDLC SERPL-MCNC: 49 MG/DL
HGB BLD-MCNC: 15.5 G/DL (ref 11.7–15.5)
LDLC SERPL CALC-MCNC: 178 MG/DL (CALC)
LYMPHOCYTES # BLD AUTO: 2812 CELLS/UL (ref 850–3900)
LYMPHOCYTES NFR BLD AUTO: 35.6 %
MCH RBC QN AUTO: 30 PG (ref 27–33)
MCHC RBC AUTO-ENTMCNC: 34 G/DL (ref 32–36)
MCV RBC AUTO: 88.2 FL (ref 80–100)
MONOCYTES # BLD AUTO: 514 CELLS/UL (ref 200–950)
MONOCYTES NFR BLD AUTO: 6.5 %
NEUTROPHILS # BLD AUTO: 4282 CELLS/UL (ref 1500–7800)
NEUTROPHILS NFR BLD AUTO: 54.2 %
NONHDLC SERPL-MCNC: 207 MG/DL (CALC)
PLATELET # BLD AUTO: 279 THOUSAND/UL (ref 140–400)
PMV BLD REES-ECKER: 10.1 FL (ref 7.5–12.5)
POTASSIUM SERPL-SCNC: 4.7 MMOL/L (ref 3.5–5.3)
PROT SERPL-MCNC: 7 G/DL (ref 6.1–8.1)
RBC # BLD AUTO: 5.17 MILLION/UL (ref 3.8–5.1)
SODIUM SERPL-SCNC: 141 MMOL/L (ref 135–146)
T4 FREE SERPL-MCNC: 1.5 NG/DL (ref 0.8–1.8)
TRIGL SERPL-MCNC: 145 MG/DL
TSH SERPL-ACNC: 0.34 MIU/L (ref 0.4–4.5)
WBC # BLD AUTO: 7.9 THOUSAND/UL (ref 3.8–10.8)

## 2025-03-12 ENCOUNTER — HOSPITAL ENCOUNTER (OUTPATIENT)
Dept: RADIOLOGY | Facility: EXTERNAL LOCATION | Age: 60
Discharge: HOME | End: 2025-03-12

## 2025-07-07 ENCOUNTER — HOSPITAL ENCOUNTER (OUTPATIENT)
Dept: RADIOLOGY | Facility: HOSPITAL | Age: 60
Discharge: HOME | End: 2025-07-07
Payer: COMMERCIAL

## 2025-07-07 ENCOUNTER — OFFICE VISIT (OUTPATIENT)
Dept: ORTHOPEDIC SURGERY | Facility: HOSPITAL | Age: 60
End: 2025-07-07
Payer: COMMERCIAL

## 2025-07-07 DIAGNOSIS — M25.551 HIP PAIN, RIGHT: ICD-10-CM

## 2025-07-07 DIAGNOSIS — M76.891 HAMSTRING TENDONITIS OF RIGHT THIGH: Primary | ICD-10-CM

## 2025-07-07 PROCEDURE — 99213 OFFICE O/P EST LOW 20 MIN: CPT | Performed by: PHYSICIAN ASSISTANT

## 2025-07-07 PROCEDURE — 73502 X-RAY EXAM HIP UNI 2-3 VIEWS: CPT | Mod: RT

## 2025-07-07 PROCEDURE — 99212 OFFICE O/P EST SF 10 MIN: CPT | Performed by: PHYSICIAN ASSISTANT

## 2025-07-07 PROCEDURE — 73502 X-RAY EXAM HIP UNI 2-3 VIEWS: CPT | Mod: RIGHT SIDE | Performed by: RADIOLOGY

## 2025-07-07 ASSESSMENT — PAIN - FUNCTIONAL ASSESSMENT: PAIN_FUNCTIONAL_ASSESSMENT: 0-10

## 2025-07-07 ASSESSMENT — PAIN SCALES - GENERAL: PAINLEVEL_OUTOF10: 5 - MODERATE PAIN

## 2025-07-07 NOTE — PROGRESS NOTES
HPI  This is a pleasant 59 y.o. female here today for further evaluation of right hip pain.  She states she pulled a hamstring back in March.  That improved with exercises and rest.  She feels as though she pulled it again back in mid April.  The pain has persisted since then.  She has a small component of groin pain as well.  That is worse with movement.  The majority of her pain is along the hamstring and at the base of the buttock.  It is throbbing and it is significantly worse with sitting.      Medical History[1]    Surgical History[2]    Social History[3]        ROS  Reviewed and all pertinent positives mentioned in HPI.      EXAM  Patient is in no acute distress.  They are alert and oriented x3.  They are of normal mood and affect.  They are in no acute distress.  The patient's limb is warm and well-perfused.  They have intact sensation to light touch in all lower extremity dermatomes.  3+/5 strength of the right HS.  TTP over the HS belly and insertion on the ischial tuberosity.  Mild groin pain with rotation of the rt hip.  Posterior pain reproduced with hip flexion.      IMAGING  Imaging reviewed today reveal no gross fracture or dislocation.  Enethesophyte at the ischial tuberosity on the right.      ASSESSMENT/PLAN  Recurrent right hamstring strain.  PT as above.  Follow up if pain persists.            Steffanie Saucedo PA-C         [1]   Past Medical History:  Diagnosis Date    Sickle-cell trait 09/24/2015    Sickle cell trait   [2]   Past Surgical History:  Procedure Laterality Date    CARPAL TUNNEL RELEASE Right     HYSTERECTOMY  2002    fibroids, 1 ovary left    OOPHORECTOMY  November 22, 2002    THYROIDECTOMY     [3]   Social History  Tobacco Use    Smoking status: Every Day     Current packs/day: 0.25     Average packs/day: 0.3 packs/day for 30.5 years (7.6 ttl pk-yrs)     Types: Cigarettes     Start date: 1995    Smokeless tobacco: Never   Vaping Use    Vaping status: Never Used   Substance Use  Topics    Alcohol use: Not Currently     Comment: a couple of drinks per year on special occasions    Drug use: Yes     Types: Marijuana     Comment: 3-4 days/week

## 2025-07-30 ENCOUNTER — EVALUATION (OUTPATIENT)
Dept: PHYSICAL THERAPY | Facility: CLINIC | Age: 60
End: 2025-07-30
Payer: COMMERCIAL

## 2025-07-30 DIAGNOSIS — M76.891 HAMSTRING TENDINITIS OF RIGHT THIGH: Primary | ICD-10-CM

## 2025-07-30 PROCEDURE — 97161 PT EVAL LOW COMPLEX 20 MIN: CPT | Mod: GP

## 2025-07-30 PROCEDURE — 97110 THERAPEUTIC EXERCISES: CPT | Mod: GP

## 2025-07-30 ASSESSMENT — COLUMBIA-SUICIDE SEVERITY RATING SCALE - C-SSRS
2. HAVE YOU ACTUALLY HAD ANY THOUGHTS OF KILLING YOURSELF?: NO
6. HAVE YOU EVER DONE ANYTHING, STARTED TO DO ANYTHING, OR PREPARED TO DO ANYTHING TO END YOUR LIFE?: NO
1. IN THE PAST MONTH, HAVE YOU WISHED YOU WERE DEAD OR WISHED YOU COULD GO TO SLEEP AND NOT WAKE UP?: NO

## 2025-07-30 ASSESSMENT — PATIENT HEALTH QUESTIONNAIRE - PHQ9
2. FEELING DOWN, DEPRESSED OR HOPELESS: NOT AT ALL
SUM OF ALL RESPONSES TO PHQ9 QUESTIONS 1 AND 2: 0
1. LITTLE INTEREST OR PLEASURE IN DOING THINGS: NOT AT ALL

## 2025-07-30 ASSESSMENT — ENCOUNTER SYMPTOMS
OCCASIONAL FEELINGS OF UNSTEADINESS: 1
LOSS OF SENSATION IN FEET: 0
DEPRESSION: 0

## 2025-07-30 NOTE — PROGRESS NOTES
Physical Therapy    Physical Therapy Evaluation    Patient Name: Rachel Powell  MRN: [id number]  Today's Date: 7/30/2025  Time Calculation  Start Time: 0913  Stop Time: 0958  Time Calculation (min): 45 min     Problem List Items Addressed This Visit           ICD-10-CM    Hamstring tendinitis of right thigh - Primary M76.891    Relevant Orders    Follow Up In Physical Therapy         Insurance:  Visit number: 1   Insurance Type: Payor: STEVAN / Plan: [employer] [employer] [place] / Product Type: *No Product type* /   Authorization or Plan of Care date Range: no auth needed       General:  Reason for visit: R hamstring    Referred by: JUDIE Saucedo MD appt:  N/A   Surgery No surgery found, No surgery found. Days since surgery: No surgery found      Precautions:  Thyroid disorder, Hx of thyroid cancer in 2018   Fall Risk: None       Assessment:   Pt presents with potential hamstring tendinopathy. Pt will benefit form skilled PT services to address strength, balance/stability, pain levels, flexibility, and overall function. Pt given HEP today and left the session with all questions answered.     Clinical Presentation: Stable and/or uncomplicated characteristics    Impairments: Pain, Strength, Balance, and Decreased functional level    Functional Limitations: Sleep is interrupted., Stair negotiation, Walking > 20 minutes, Standing > 20 minutes, and Sitting > 15 minutes    Recommended Treatment:  Therapeutic exercise, Manual therapy, Home program instruction and progression, Neuromuscular re-education, Therapeutic activities, Self care and home management, Instruction in activity modification, Electrical stimulation, and Vasopneumatic device + cold      Plan:  Plan of care was developed with input and agreement by the patient.  1x/week for 6 weeks       Rehab Potential:   Good to achieve goals.      Goals:  STG's (within 2 weeks)  Rachel Powell will decrease pain by >/= 2/10 points from baseline to improve ability to perform  household/recreational activities.    Rachel Powell will be able to sleep through the night with less than 2 interruptions due to pain in order to improve mental and physical well-being in order to safely participate in ADLs.     Rachel Powell will demonstrate independence,  excellent understanding of and report compliance with at least 3 exercises in her HEP to facilitate the learning process to maximize PT benefits and to facilitate independent rehab program upon discharge.    LTG's (by discharge)    Rachel will increase strength to >/=4+/5 to improve ability to perform household/recreational activities.     Rachel will demonstrate ambulating >/=1000 ft with minimal gait deviation and no reports of pain or discomfort.      Rachel will report ability to sit comfortably for >/=30 minutes without reports of pain or discomfort.     Rachel Powell will decrease pain to 0-2/10 to improve ability to perform household/recreational activities.    Rachel Powell will be able to sleep through the night without waking due to pain in order to improve mental and physical well-being in order to safely participate in ADLs.     Rachel Powell will demonstrate independence,  excellent understanding of and report compliance with HEP to facilitate the learning process to maximize PT benefits and to facilitate independent rehab program upon discharge.      Subjective:  CC:  Pt reports that in April she felt pain in her proximal hamstring. Reports that she treated this with heat, ice, and soaking which helped for some time but the pain returned more recently. Reports that the pain is worse with sitting and better when standing and walking. This pain now goes to the groin as well. Reports that the pain may have began when she was mopping a stair well but cannot directly pin point it to this. Denies paresthesias. Reports a history of back pain as well. Reports that her leg feels weak and this can be fatiguing when she is at work or has  "to navigate a lot of stairs or walking distances. Reports that she feels her knee buckling occasionally.   MOUNIKA:  N/A  DOI: N/A  PAIN - Location: R hip/HS   Worst(past 24 hours): 7  Least(past 24 hours): 2  Pain Quality: aching and tightness  PAIN - Alleviating: OTC NSAIDs, rest, heat, and ice  Aggravating: standing, walking > 20 minutes, and sitting > minutes  MEDICAL MANAGEMENT: Referred to Physical Therapy, Radiographs, OTC medication, Orthopedic specialist, Physical therapy in the past, Rest, Ice, and Heat  PLOF: Independent with manageable pain  FUNCTIONAL LIMITATIONS: Sleep is interrupted., Stair negotiation, Walking > 20 minutes, Standing > 20 minutes, and Sitting > 15 minutes   LIVING ENVIRONMENT: apartment- with stairs   WORK: working full time- house keeper   EXERCISE: stretching, house work   Patient Stated Goal: alleviate pain and restore function      Medical History Form: Reviewed (scanned into chart)      Objective:   Objective     Gait: antalgic, B knee valgus     Balance: 20s B tandem stance     ROM: (WFL unless otherwise noted)   Trunk flexion: WFL, tightness   Trunk extension: WFL, feels good   R hip flexion: 95   L hip flexion: 95  R hip abduction:  L hip abduction:  R hip extension:  L hip extension:     R knee flexion:  L knee flexion:  R knee extension:  L knee extension:       MMT:  R hip flexion: 4-, pain   L hip flexion: 4  R hip abduction: 4+  L hip abduction: 5  R hip extension: 4-  L hip extension: 4    R knee flexion: 5  L knee flexion: 5  R knee extension: 5  L knee extension: 5    R ankle DF: 5  L ankle DF: 5      Prone 90/90 hamstring R: 4  \" \" L: 4+      Special Tests: scour (-), DOROTEO (-), FADIR (-)    Tenderness: R glute med, piriformis, ITB, proximal hamstring     Joint Play: WFL R hip       Outcome Measures:  Other Measures  Lower Extremity Funtional Score (LEFS): 47       Treatment Performed: (\"NP\" = Not Performed)     Therapeutic Exercise:     12 minutes  Home exercise program " instructed and issued.  HL pelvic tilt 5s hold x 10   HL hip add 5s hold x 10   HL hip abd blue TB 5s hold x 10   Bridge x 10 with blue TB   SL hip abd x 10   SL clamshells x 10   Discussed gentle hamstring stretching without forceful holds   Discussed continuing with lumbar extensions from previous PT exercises if this decreases back pain     Manual Therapy:       minutes      Neuromuscular Re-education:      minutes      Gait Training:            minutes      Aquatics:            minutes      Therapeutic Activity:      minutes      Modalities:       Vasopneumatic Device       minutes  Electrical Stimulation          minutes  Ultrasound            minutes  Iontophoresis                     minutes  Cold Pack            minutes  Mechanical Traction           minutes    Self Care Home Management:    minutes    Canalith Reposition:          minutes     Education:          minutes    Other:       minutes      Evaluation Complexity: Low: 33 minutes; Moderate   minutes; Complex PT Evaluation Time Entry: 33;  minutes    Re-Evaluation:   minutes

## 2025-08-01 DIAGNOSIS — E89.0 POSTOPERATIVE HYPOTHYROIDISM: ICD-10-CM

## 2025-08-04 RX ORDER — LEVOTHYROXINE SODIUM 125 UG/1
125 TABLET ORAL
Qty: 90 TABLET | Refills: 0 | Status: SHIPPED | OUTPATIENT
Start: 2025-08-04 | End: 2026-08-04

## 2025-08-08 ENCOUNTER — TREATMENT (OUTPATIENT)
Dept: PHYSICAL THERAPY | Facility: CLINIC | Age: 60
End: 2025-08-08
Payer: COMMERCIAL

## 2025-08-08 DIAGNOSIS — M76.891 HAMSTRING TENDINITIS OF RIGHT THIGH: ICD-10-CM

## 2025-08-08 PROCEDURE — 97140 MANUAL THERAPY 1/> REGIONS: CPT | Mod: GP,CQ

## 2025-08-08 PROCEDURE — 97110 THERAPEUTIC EXERCISES: CPT | Mod: GP,CQ

## 2025-08-08 NOTE — PROGRESS NOTES
Physical Therapy Treatment    Patient Name: Rachel Powell  MRN: [id number]  YOB: 1965  Encounter Date: 8/8/2025    Time Entry:  Time Calculation  Start Time: 0850  Stop Time: 0945  Time Calculation (min): 55 min     PT Therapeutic Procedures Time Entry  Therapeutic Exercise Time Entry: 40  Manual Therapy Time Entry: 10                   Rehab Insurance Information:   Visit Count: 2  POC Visits: 6  Auth Required: No             Rehab Falls Risk Assessment:    No fall risk      Problem List Items Addressed This Visit           ICD-10-CM    Hamstring tendinitis of right thigh M76.891       Precautions       Additional Precautions and Protocol Details: Thyroid disorder, Hx of thyroid cancer in 2018  Fall Risk: None     Subjective   Pt states that her pain is not too bad today (3/10), but pain was 8/10 last night after work. HEP: compliant and going well..  Pain reported as 3. throbbing, stabbing         Objective   LEFS: 47           Activities   Therapeutic Exercise  Therapeutic Exercise Performed: Yes  Therapeutic Exercise Activity 1: repeated forward bending 10x (increased pain)  Therapeutic Exercise Activity 2: standing back bends 10x (decreased pain)  Therapeutic Exercise Activity 3: repeated dktc 10x  Therapeutic Exercise Activity 4: prone lying x 2 minutes (increased R hamstring pain)  Therapeutic Exercise Activity 5: prone on elbows x 2 minutes  Therapeutic Exercise Activity 6: prone press-ups (pain in both hamstrings) x 10  Therapeutic Exercise Activity 7: supine dktc 2 x 30 sec hold  Therapeutic Exercise Activity 8: supine sktc 2 x 30 sec hold  Therapeutic Exercise Activity 9: supine modified glute stretch 2 x 30 sec hold (R LE sx's)  Therapeutic Exercise Activity 10: seated forward trunk flexion 10x (increased hamstring pain)       Therapeutic Activity  Therapeutic Activity Performed: No   Balance/Neuromuscular Re-Education  Balance/Neuromuscular Re-Education Activity Performed: No   Manual  Therapy  Manual Therapy Performed: Yes  Manual Therapy Activity 1: L side-lying: stm of R hamstrings, glute medius   Modalities  Modalities Used: Yes  Modality 1: Untimed Cold Pack (L side-lying x 5 minutes)       Gait Training  Gait Training Performed: No                      Other Activity  Other Activity Performed: No    Education  Education was done with Patient.           [employer]: [id number], Supine Double Knee to Chest  - 3 x daily - 7 x weekly - 2 sets - 10 reps      Assessment/Plan   Assessment: Pt continues to be able to abolish hamstring pain w/ repeated dktc and displays improved tolerance of sit-to-stand transition. Pt displays tightness and trigger points throughout bicep femoris and c/o tenderness at proximal hamstring insertion. Pt denies pain following treatment session. Added dktc 3x per day to HEP.        Plan: Assess tolerance of dktc next visit.

## 2025-08-11 ENCOUNTER — TREATMENT (OUTPATIENT)
Dept: PHYSICAL THERAPY | Facility: CLINIC | Age: 60
End: 2025-08-11
Payer: COMMERCIAL

## 2025-08-11 DIAGNOSIS — M76.891 HAMSTRING TENDINITIS OF RIGHT THIGH: ICD-10-CM

## 2025-08-11 PROCEDURE — 97140 MANUAL THERAPY 1/> REGIONS: CPT | Mod: GP

## 2025-08-11 PROCEDURE — 97110 THERAPEUTIC EXERCISES: CPT | Mod: GP

## 2025-08-20 ENCOUNTER — TREATMENT (OUTPATIENT)
Dept: PHYSICAL THERAPY | Facility: CLINIC | Age: 60
End: 2025-08-20
Payer: COMMERCIAL

## 2025-08-20 DIAGNOSIS — M76.891 HAMSTRING TENDINITIS OF RIGHT THIGH: ICD-10-CM

## 2025-08-20 PROCEDURE — 97110 THERAPEUTIC EXERCISES: CPT | Mod: GP

## 2025-08-20 PROCEDURE — 97010 HOT OR COLD PACKS THERAPY: CPT | Mod: GP

## 2025-08-25 ENCOUNTER — TREATMENT (OUTPATIENT)
Dept: PHYSICAL THERAPY | Facility: CLINIC | Age: 60
End: 2025-08-25
Payer: COMMERCIAL

## 2025-08-25 DIAGNOSIS — M76.891 HAMSTRING TENDINITIS OF RIGHT THIGH: ICD-10-CM

## 2025-08-25 PROCEDURE — 97110 THERAPEUTIC EXERCISES: CPT | Mod: GP,CQ

## 2025-08-25 PROCEDURE — 97140 MANUAL THERAPY 1/> REGIONS: CPT | Mod: GP,CQ

## 2025-09-03 ENCOUNTER — TREATMENT (OUTPATIENT)
Dept: PHYSICAL THERAPY | Facility: CLINIC | Age: 60
End: 2025-09-03
Payer: COMMERCIAL

## 2025-09-03 DIAGNOSIS — M76.891 HAMSTRING TENDINITIS OF RIGHT THIGH: ICD-10-CM

## 2025-09-03 PROCEDURE — 97530 THERAPEUTIC ACTIVITIES: CPT | Mod: GP
